# Patient Record
Sex: FEMALE | URBAN - METROPOLITAN AREA
[De-identification: names, ages, dates, MRNs, and addresses within clinical notes are randomized per-mention and may not be internally consistent; named-entity substitution may affect disease eponyms.]

---

## 2023-08-24 ENCOUNTER — APPOINTMENT (RX ONLY)
Dept: URBAN - METROPOLITAN AREA CLINIC 102 | Facility: CLINIC | Age: 28
Setting detail: DERMATOLOGY
End: 2023-08-24

## 2023-08-24 PROCEDURE — ? ADDITIONAL NOTES

## 2023-08-24 NOTE — PROCEDURE: ADDITIONAL NOTES
Render Risk Assessment In Note?: no
Additional Notes: Patient's concerns include: \\n-  Left neck keloid causing discomfort related to the large size and location\\n- Left breast keloid causing pain\\n\\nFactors altering surgical decisions (hx/exam findings): \\n-  Left neck keloid present for over 4 years (hx of cutting the site with glass from a phone) with large pedunculated size\\n- Large left breast raised keloid with tenderness to palpation\\n- healthy, non-smoker, , no previous tx\\n\\nProposed intervention(s):\\n-  Recommend multimodal treatment for the left neck keloid including surgery (resection with local tissue advancement vs skin graft) with intra-op steroid injection, and postop scar regiment. Recommend left breast keloid treatment with symptom management including steroid injection to the site to help relieve pain. Given the locations of the keloids, they would not be amenable to pressure therapy. \\n- Discussed risks associated with each recommended treatment modality including risk of developing hypopigmentation at the site of steroid injections, which the patient understands and is willing to accept this trade off. Discussed other treatment modalities including radiation, which the patient was not interested in as well as other nonsurgical treatments like gel sheets, compression, lasers, cryotherapy, and immunosuppressive agents . Discussed the high risk of recurrence despite use of multiple treatment modalities, which the patient understands and is still interested in proceeding with treatment. \\n- Discussed postop care and risks of surgery with intra-op steroid injection including recurrence, hypopigmentation, infection, bleeding, scar, damage to nearby structures, need for future surgery, hematoma, asymmetry, sensory change, tissue necrosis, and poor cosmetic result. \\n-Letter will be sent to the patient's insurance to get pre-auth for left neck keloid surgery with kenalog injection and then left breast keloid series of kenalog injections 6 weeks apart.